# Patient Record
Sex: FEMALE | URBAN - METROPOLITAN AREA
[De-identification: names, ages, dates, MRNs, and addresses within clinical notes are randomized per-mention and may not be internally consistent; named-entity substitution may affect disease eponyms.]

---

## 2017-09-12 ENCOUNTER — APPOINTMENT (RX ONLY)
Dept: URBAN - METROPOLITAN AREA CLINIC 47 | Facility: CLINIC | Age: 27
Setting detail: DERMATOLOGY
End: 2017-09-12

## 2017-09-12 DIAGNOSIS — L30.1 DYSHIDROSIS [POMPHOLYX]: ICD-10-CM

## 2017-09-12 DIAGNOSIS — L21.8 OTHER SEBORRHEIC DERMATITIS: ICD-10-CM

## 2017-09-12 PROCEDURE — 99213 OFFICE O/P EST LOW 20 MIN: CPT

## 2017-09-12 PROCEDURE — ? PRESCRIPTION

## 2017-09-12 PROCEDURE — ? COUNSELING

## 2017-09-12 RX ORDER — TACROLIMUS 0.3 MG/G
OINTMENT TOPICAL
Qty: 1 | Refills: 0 | Status: ERX | COMMUNITY
Start: 2017-09-12

## 2017-09-12 RX ORDER — KETOCONAZOLE 20.5 MG/ML
SHAMPOO, SUSPENSION TOPICAL BIW
Qty: 1 | Refills: 0 | Status: ERX | COMMUNITY
Start: 2017-09-12

## 2017-09-12 RX ADMIN — KETOCONAZOLE: 20.5 SHAMPOO, SUSPENSION TOPICAL at 00:00

## 2017-09-12 RX ADMIN — TACROLIMUS: 0.3 OINTMENT TOPICAL at 00:00

## 2017-09-12 ASSESSMENT — LOCATION DETAILED DESCRIPTION DERM
LOCATION DETAILED: LEFT CENTRAL EYEBROW
LOCATION DETAILED: LEFT RADIAL DORSAL HAND
LOCATION DETAILED: POSTERIOR MID-PARIETAL SCALP
LOCATION DETAILED: RIGHT CENTRAL EYEBROW
LOCATION DETAILED: RIGHT PLANTAR FOREFOOT OVERLYING 3RD METATARSAL
LOCATION DETAILED: RIGHT RADIAL DORSAL HAND
LOCATION DETAILED: RIGHT MEDIAL FRONTAL SCALP
LOCATION DETAILED: LEFT PLANTAR FOREFOOT OVERLYING 2ND METATARSAL
LOCATION DETAILED: LEFT DORSAL FOOT
LOCATION DETAILED: LEFT ACHILLES SKIN

## 2017-09-12 ASSESSMENT — LOCATION SIMPLE DESCRIPTION DERM
LOCATION SIMPLE: RIGHT PLANTAR SURFACE
LOCATION SIMPLE: LEFT ACHILLES SKIN
LOCATION SIMPLE: LEFT FOOT
LOCATION SIMPLE: LEFT PLANTAR SURFACE
LOCATION SIMPLE: RIGHT EYEBROW
LOCATION SIMPLE: LEFT EYEBROW
LOCATION SIMPLE: RIGHT SCALP
LOCATION SIMPLE: POSTERIOR SCALP
LOCATION SIMPLE: RIGHT HAND
LOCATION SIMPLE: LEFT HAND

## 2017-09-12 ASSESSMENT — LOCATION ZONE DERM
LOCATION ZONE: FACE
LOCATION ZONE: FEET
LOCATION ZONE: SCALP
LOCATION ZONE: HAND
LOCATION ZONE: LEG

## 2017-10-06 ENCOUNTER — HOSPITAL ENCOUNTER (INPATIENT)
Dept: HOSPITAL 10 - OBT | Age: 27
LOS: 3 days | Discharge: HOME | End: 2017-10-09
Attending: SPECIALIST | Admitting: SPECIALIST
Payer: COMMERCIAL

## 2017-10-06 VITALS — DIASTOLIC BLOOD PRESSURE: 73 MMHG | HEART RATE: 113 BPM | SYSTOLIC BLOOD PRESSURE: 125 MMHG | RESPIRATION RATE: 18 BRPM

## 2017-10-06 VITALS — BODY MASS INDEX: 25.51 KG/M2 | WEIGHT: 143.96 LBS | HEIGHT: 63 IN

## 2017-10-06 DIAGNOSIS — Z23: ICD-10-CM

## 2017-10-06 DIAGNOSIS — Z3A.40: ICD-10-CM

## 2017-10-06 DIAGNOSIS — O48.0: Primary | ICD-10-CM

## 2017-10-06 LAB
ABNORMAL IP MESSAGE: 1
APTT BLD: 27.9 SEC (ref 25–35)
BASOPHILS # BLD AUTO: 0 10^3/UL (ref 0–0.1)
BASOPHILS NFR BLD: 0.1 % (ref 0–2)
EOSINOPHIL # BLD: 0.1 10^3/UL (ref 0–0.5)
EOSINOPHIL NFR BLD: 1.2 % (ref 0–7)
ERYTHROCYTE [DISTWIDTH] IN BLOOD BY AUTOMATED COUNT: 13.4 % (ref 11.5–14.5)
HCT VFR BLD CALC: 41.1 % (ref 37–47)
HGB BLD-MCNC: 13.9 G/DL (ref 12–16)
INR PPP: 0.85
LYMPHOCYTES # BLD AUTO: 1.6 10^3/UL (ref 0.8–2.9)
LYMPHOCYTES NFR BLD AUTO: 15.9 % (ref 15–51)
MCH RBC QN AUTO: 30.4 PG (ref 29–33)
MCHC RBC AUTO-ENTMCNC: 33.8 G/DL (ref 32–37)
MCV RBC AUTO: 89.9 FL (ref 82–101)
MONOCYTES # BLD: 1.1 10^3/UL (ref 0.3–0.9)
MONOCYTES NFR BLD: 10.7 % (ref 0–11)
NEUTROPHILS # BLD: 7.4 10^3/UL (ref 1.6–7.5)
NEUTROPHILS NFR BLD AUTO: 71.5 % (ref 39–77)
NRBC # BLD MANUAL: 0 10^3/UL (ref 0–0)
NRBC BLD AUTO-RTO: 0 /100WBC (ref 0–0)
PLATELET # BLD: 115 10^3/UL (ref 140–415)
PMV BLD AUTO: 13.1 FL (ref 7.4–10.4)
POSITIVE DIFF: (no result)
PROTHROMBIN TIME: 11.6 SEC (ref 12.2–14.2)
PT RATIO: 0.9
RBC # BLD AUTO: 4.57 10^6/UL (ref 4.2–5.4)
WBC # BLD AUTO: 10.3 10^3/UL (ref 4.8–10.8)

## 2017-10-06 PROCEDURE — 86900 BLOOD TYPING SEROLOGIC ABO: CPT

## 2017-10-06 PROCEDURE — 4A1HXCZ MONITORING OF PRODUCTS OF CONCEPTION, CARDIAC RATE, EXTERNAL APPROACH: ICD-10-PCS | Performed by: OBSTETRICS & GYNECOLOGY

## 2017-10-06 PROCEDURE — 0UQGXZZ REPAIR VAGINA, EXTERNAL APPROACH: ICD-10-PCS | Performed by: OBSTETRICS & GYNECOLOGY

## 2017-10-06 PROCEDURE — 87340 HEPATITIS B SURFACE AG IA: CPT

## 2017-10-06 PROCEDURE — 90715 TDAP VACCINE 7 YRS/> IM: CPT

## 2017-10-06 PROCEDURE — 62319: CPT

## 2017-10-06 PROCEDURE — 80053 COMPREHEN METABOLIC PANEL: CPT

## 2017-10-06 PROCEDURE — 85025 COMPLETE CBC W/AUTO DIFF WBC: CPT

## 2017-10-06 PROCEDURE — 86592 SYPHILIS TEST NON-TREP QUAL: CPT

## 2017-10-06 PROCEDURE — 84560 ASSAY OF URINE/URIC ACID: CPT

## 2017-10-06 PROCEDURE — 81003 URINALYSIS AUTO W/O SCOPE: CPT

## 2017-10-06 PROCEDURE — G0463 HOSPITAL OUTPT CLINIC VISIT: HCPCS

## 2017-10-06 PROCEDURE — 86901 BLOOD TYPING SEROLOGIC RH(D): CPT

## 2017-10-06 PROCEDURE — 85610 PROTHROMBIN TIME: CPT

## 2017-10-06 PROCEDURE — 85730 THROMBOPLASTIN TIME PARTIAL: CPT

## 2017-10-06 PROCEDURE — 81001 URINALYSIS AUTO W/SCOPE: CPT

## 2017-10-06 RX ADMIN — PYRIDOXINE HYDROCHLORIDE SCH MLS/HR: 100 INJECTION, SOLUTION INTRAMUSCULAR; INTRAVENOUS at 21:55

## 2017-10-06 NOTE — TRIAGE
===================================

OB Triage

===================================

Datetime Report Generated by CPN: 10/06/2017 21:07

   

   

===========================

Datetime: 10/06/2017 20:39

===========================

   

 Stage of Pregnancy:  OB Triage

   

===================================

Labor Evaluation

===================================

   

 Monitor Mode:  External

 Pattern:  Normal: <= 5 Contractions in 10 Minutes

 Resting Tone [place]:  Relaxed

   

===================================

Fetal Heart Rate

===================================

   

 FHR Baseline Rate:  135

 Monitor Mode:  External US

 FHR Baseline Changes:  No Baseline Change

 Variability:  Moderate 6-25 bpm

 Accelerations:  15X15

 Decelerations:  None

 Category:  Category I

   

===================================

Pain Assessment

===================================

   

 Pain Scale:  2

 Pain Presence:  Intermittent

 Pain Type:  Cramping

   

===================================

Vaginal Exam

===================================

   

 Dilatation (cms):  1.0

 Effacement (%):  70

 Station:  0

 Exam By:  ALEXIS Lange

 Membrane Status:  Ruptured

 Amniotic Fluid Color:  Clear

 Amniotic Fluid Amount:  Moderate

 Amniotic Fluid Odor:  Normal

 Vaginal Bleeding:  Scant

 Pool:  Positive

 Nitrazine:  Positive

 Cervix, Consistency:  Soft

 Cervix, Position:  Posterior

 Fetal Presentation 'A':  Cephalic

   

===========================

Datetime: 10/06/2017 20:00

===========================

   

 Time of Arrival:  10/06/2017 19:20

 EGA:  40.4

 Arrived By:  Wheelchair

 Arrived From:  Home

 Chief Complaint:   c/o leaking mod amt fluid since 1530. Denies hx problems this pregnancy

 Fetal Movement:  Present

 Contractions:  Denies/Absent

 Rupture of Membranes:  Ruptured

 Vaginal Bleeding:  None

 Vaginal Discharge:  Present

 Recent Sexual Intercouse:  Denies

 Abdominal Trauma:  Not Applicable

 Patient Complaints:  Other

 Time Provider Notified:  10/06/2017 19:35

 Provider Notified:  Dr Enciso

 Initial Plan:  MAEGAN PHAN

## 2017-10-06 NOTE — HP
Date/Time of Note


Date/Time of Note


DATE: 10/6/17 


TIME: 21:38





OB - History


Hx of Present Pregnancy


Free Text/Dictation


27 y.o primigravida  at 40w4d  presented  to triage with c/o leaking  fluid 

since .


had  unevenful prenatal course except 1st trimester vaginal spotting.


initial ve    with confirmation of srom


admitted for pitocin augmentation


Chief Complaint:  SROM  at 1530


Estimated Due Date:  Oct 2, 2017


:  1


Para:  0


Spontaneous :  0


Therapeutic :  0


Prenatal Care:  Good Care


Ultrasounds:  No ultrasounds, Normal mid trimester US


Obstetrical Complications:  None


Medical Complications:  None





Past Family/Social History


*


Past Medical, Surgical, Family and Obstetric Histories reviewed from prenatal 

chart.


Blood Type:  O+


Rubella:  immune


RPR/VDRL:  Negative


GBS Status:  Negative


HBsAG:  Negative





OB  Admission Exam


Vital Signs


Vital Signs





 Vital Signs








  Date Time  Temp Pulse Resp B/P Pulse Ox O2 Delivery O2 Flow Rate FiO2


 


10/6/17 20:50 98.7 113 18 125/73  Room Air  











Physical Exam


HEENT:  WNL


Heart:  Rhythm Normal


Lungs:  Clear, Equal


Abdomen:  WNL


Extremities:  Normal


Reflexes:  Normal


Cervical Dilatation:  1cm


Effacement:  75%


Station:  0


Membranes:  Ruptured


Amniotic Fluid:  Clear


Fetal Heart Rate:  130's


Accelerations:  Accelerations Present


Decelerations:  No Decelerations


Varibility:  Moderate


Contractions on Admission:  6-10 Minutes Apart


Intensity:  Mild





OB  Assessment/Plan


Reason for admission:  rupture of membranes


Other Assessment:


IUP 40w4d


Plan:  Other (augmenttation)


Induction Method:  per Pitocin Protocol











SAM VOSS MD Oct 6, 2017 21:48

## 2017-10-07 VITALS — DIASTOLIC BLOOD PRESSURE: 80 MMHG | RESPIRATION RATE: 20 BRPM | SYSTOLIC BLOOD PRESSURE: 121 MMHG | HEART RATE: 97 BPM

## 2017-10-07 VITALS — SYSTOLIC BLOOD PRESSURE: 113 MMHG | RESPIRATION RATE: 19 BRPM | DIASTOLIC BLOOD PRESSURE: 63 MMHG

## 2017-10-07 LAB
ADD UMIC: NO
ALBUMIN SERPL-MCNC: 3.5 G/DL (ref 3.3–4.9)
ALBUMIN/GLOB SERPL: 0.89 {RATIO}
ALP SERPL-CCNC: 236 IU/L (ref 42–121)
ALT SERPL-CCNC: 38 IU/L (ref 13–69)
ANION GAP SERPL CALC-SCNC: 10 MMOL/L (ref 8–16)
AST SERPL-CCNC: 26 IU/L (ref 15–46)
BILIRUB DIRECT SERPL-MCNC: 0 MG/DL (ref 0–0.2)
BILIRUB SERPL-MCNC: 0.1 MG/DL (ref 0.2–1.3)
BUN SERPL-MCNC: 11 MG/DL (ref 7–20)
CALCIUM SERPL-MCNC: 9.9 MG/DL (ref 8.4–10.2)
CHLORIDE SERPL-SCNC: 107 MMOL/L (ref 97–110)
CO2 SERPL-SCNC: 24 MMOL/L (ref 21–31)
COLOR UR: (no result)
CREAT SERPL-MCNC: 0.56 MG/DL (ref 0.44–1)
GLOBULIN SER-MCNC: 3.9 G/DL (ref 1.3–3.2)
GLUCOSE SERPL-MCNC: 88 MG/DL (ref 70–220)
GLUCOSE UR STRIP-MCNC: NEGATIVE MG/DL
KETONES UR STRIP.AUTO-MCNC: (no result) MG/DL
NITRITE UR QL STRIP.AUTO: NEGATIVE MG/DL
POTASSIUM SERPL-SCNC: 3.7 MMOL/L (ref 3.5–5.1)
PROT SERPL-MCNC: 7.4 G/DL (ref 6.1–8.1)
RBC # UR AUTO: NEGATIVE MG/DL
SODIUM SERPL-SCNC: 137 MMOL/L (ref 135–144)
UR ASCORBIC ACID: NEGATIVE MG/DL
UR BILIRUBIN (DIP): NEGATIVE MG/DL
UR CLARITY: (no result)
UR PH (DIP): 5 (ref 5–9)
UR RBC: 2 /HPF (ref 0–5)
UR SPECIFIC GRAVITY (DIP): 1.02 (ref 1–1.03)
UR TOTAL PROTEIN (DIP): NEGATIVE MG/DL
URATE SERPL-MCNC: 4.3 MG/DL (ref 3.1–7.9)
UROBILINOGEN UR STRIP-ACNC: NEGATIVE MG/DL
WBC # UR STRIP: NEGATIVE LEU/UL

## 2017-10-07 RX ADMIN — PYRIDOXINE HYDROCHLORIDE SCH MLS/HR: 100 INJECTION, SOLUTION INTRAMUSCULAR; INTRAVENOUS at 05:32

## 2017-10-07 RX ADMIN — IBUPROFEN SCH MG: 600 TABLET ORAL at 17:31

## 2017-10-07 RX ADMIN — DOCUSATE SODIUM AND SENNOSIDES SCH TAB: 8.6; 5 TABLET, FILM COATED ORAL at 21:37

## 2017-10-08 VITALS — RESPIRATION RATE: 18 BRPM | HEART RATE: 85 BPM | SYSTOLIC BLOOD PRESSURE: 101 MMHG | DIASTOLIC BLOOD PRESSURE: 48 MMHG

## 2017-10-08 VITALS — HEART RATE: 78 BPM | RESPIRATION RATE: 18 BRPM | DIASTOLIC BLOOD PRESSURE: 72 MMHG | SYSTOLIC BLOOD PRESSURE: 118 MMHG

## 2017-10-08 VITALS — SYSTOLIC BLOOD PRESSURE: 114 MMHG | RESPIRATION RATE: 18 BRPM | DIASTOLIC BLOOD PRESSURE: 66 MMHG | HEART RATE: 84 BPM

## 2017-10-08 VITALS — DIASTOLIC BLOOD PRESSURE: 63 MMHG | SYSTOLIC BLOOD PRESSURE: 117 MMHG | HEART RATE: 79 BPM | RESPIRATION RATE: 18 BRPM

## 2017-10-08 VITALS — RESPIRATION RATE: 18 BRPM | DIASTOLIC BLOOD PRESSURE: 83 MMHG | HEART RATE: 78 BPM | SYSTOLIC BLOOD PRESSURE: 97 MMHG

## 2017-10-08 LAB
BASOPHILS # BLD AUTO: 0 10^3/UL (ref 0–0.1)
BASOPHILS NFR BLD: 0.2 % (ref 0–2)
EOSINOPHIL # BLD: 0.2 10^3/UL (ref 0–0.5)
EOSINOPHIL NFR BLD: 1.3 % (ref 0–7)
ERYTHROCYTE [DISTWIDTH] IN BLOOD BY AUTOMATED COUNT: 14.1 % (ref 11.5–14.5)
HCT VFR BLD CALC: 34 % (ref 37–47)
HGB BLD-MCNC: 11 G/DL (ref 12–16)
LYMPHOCYTES # BLD AUTO: 2.6 10^3/UL (ref 0.8–2.9)
LYMPHOCYTES NFR BLD AUTO: 17 % (ref 15–51)
MCH RBC QN AUTO: 29.3 PG (ref 29–33)
MCHC RBC AUTO-ENTMCNC: 32.4 G/DL (ref 32–37)
MCV RBC AUTO: 90.7 FL (ref 82–101)
MONOCYTES # BLD: 1.3 10^3/UL (ref 0.3–0.9)
MONOCYTES NFR BLD: 8.3 % (ref 0–11)
NEUTROPHILS # BLD: 11 10^3/UL (ref 1.6–7.5)
NEUTROPHILS NFR BLD AUTO: 72.7 % (ref 39–77)
NRBC # BLD MANUAL: 0 10^3/UL (ref 0–0)
NRBC BLD AUTO-RTO: 0 /100WBC (ref 0–0)
PLATELET # BLD: 121 10^3/UL (ref 140–415)
PMV BLD AUTO: 12.3 FL (ref 7.4–10.4)
RBC # BLD AUTO: 3.75 10^6/UL (ref 4.2–5.4)
WBC # BLD AUTO: 15.1 10^3/UL (ref 4.8–10.8)

## 2017-10-08 RX ADMIN — IBUPROFEN SCH MG: 600 TABLET ORAL at 23:30

## 2017-10-08 RX ADMIN — IBUPROFEN SCH MG: 600 TABLET ORAL at 12:11

## 2017-10-08 RX ADMIN — IBUPROFEN SCH MG: 600 TABLET ORAL at 00:27

## 2017-10-08 RX ADMIN — IBUPROFEN SCH MG: 600 TABLET ORAL at 05:35

## 2017-10-08 RX ADMIN — DOCUSATE SODIUM AND SENNOSIDES SCH TAB: 8.6; 5 TABLET, FILM COATED ORAL at 20:32

## 2017-10-08 RX ADMIN — IBUPROFEN SCH MG: 600 TABLET ORAL at 17:25

## 2017-10-08 RX ADMIN — DOCUSATE SODIUM AND SENNOSIDES SCH TAB: 8.6; 5 TABLET, FILM COATED ORAL at 10:16

## 2017-10-08 NOTE — DS
Date/Time of Note


Date/Time of Note


DATE: 10/8/17 


TIME: 11:01





Obstetrical Discharge Record


Final Diagnosis


Final Diagnosis:  Term delivered





Vaginal Delivery


Obstetrical Delivery:  Spontaneous





Complications


 Rupture of Membranes:  No





Condition on Discharge


Physical Assessment


Voiding:  Yes


Bowel Movement:  Yes


Breast:  Soft, non-tender, Filling


Fundus:  Firm


Abdomen and Incision:


soft and not tender


Episiotomy:


no complaints


Calf Tenderness:  No


Patient Condition:  Good











OTIS TEIXEIRA MD Oct 8, 2017 11:02

## 2017-10-09 VITALS — DIASTOLIC BLOOD PRESSURE: 59 MMHG | HEART RATE: 68 BPM | RESPIRATION RATE: 16 BRPM | SYSTOLIC BLOOD PRESSURE: 94 MMHG

## 2017-10-09 VITALS — RESPIRATION RATE: 18 BRPM | DIASTOLIC BLOOD PRESSURE: 59 MMHG | HEART RATE: 73 BPM | SYSTOLIC BLOOD PRESSURE: 102 MMHG

## 2017-10-09 PROCEDURE — 3E0234Z INTRODUCTION OF SERUM, TOXOID AND VACCINE INTO MUSCLE, PERCUTANEOUS APPROACH: ICD-10-PCS | Performed by: SPECIALIST

## 2017-10-09 RX ADMIN — IBUPROFEN SCH MG: 600 TABLET ORAL at 12:00

## 2017-10-09 RX ADMIN — IBUPROFEN SCH MG: 600 TABLET ORAL at 05:31

## 2017-10-09 RX ADMIN — DOCUSATE SODIUM AND SENNOSIDES SCH TAB: 8.6; 5 TABLET, FILM COATED ORAL at 10:35

## 2022-10-06 ENCOUNTER — APPOINTMENT (OUTPATIENT)
Dept: OBGYN | Facility: CLINIC | Age: 32
End: 2022-10-06

## 2022-10-06 PROCEDURE — 99204 OFFICE O/P NEW MOD 45 MIN: CPT | Mod: 25

## 2022-10-06 PROCEDURE — 87220 TISSUE EXAM FOR FUNGI: CPT

## 2022-10-06 PROCEDURE — 83986 ASSAY PH BODY FLUID NOS: CPT | Mod: QW

## 2022-10-06 PROCEDURE — 56820 COLPOSCOPY VULVA: CPT

## 2022-10-06 PROCEDURE — 81002 URINALYSIS NONAUTO W/O SCOPE: CPT

## 2022-10-06 PROCEDURE — 87210 SMEAR WET MOUNT SALINE/INK: CPT | Mod: QW

## 2022-11-16 ENCOUNTER — APPOINTMENT (OUTPATIENT)
Dept: OBGYN | Facility: CLINIC | Age: 32
End: 2022-11-16

## 2022-11-16 PROCEDURE — 99213 OFFICE O/P EST LOW 20 MIN: CPT

## 2022-12-13 ENCOUNTER — APPOINTMENT (OUTPATIENT)
Dept: OBGYN | Facility: CLINIC | Age: 32
End: 2022-12-13

## 2023-01-19 ENCOUNTER — APPOINTMENT (OUTPATIENT)
Dept: HUMAN REPRODUCTION | Facility: CLINIC | Age: 33
End: 2023-01-19
Payer: SELF-PAY

## 2023-01-19 PROCEDURE — 36415 COLL VENOUS BLD VENIPUNCTURE: CPT

## 2023-01-19 PROCEDURE — 76830 TRANSVAGINAL US NON-OB: CPT

## 2023-01-19 PROCEDURE — 99205 OFFICE O/P NEW HI 60 MIN: CPT | Mod: 25

## 2023-01-24 PROBLEM — Z00.00 ENCOUNTER FOR PREVENTIVE HEALTH EXAMINATION: Status: ACTIVE | Noted: 2023-01-24

## 2023-01-26 ENCOUNTER — APPOINTMENT (OUTPATIENT)
Dept: OTOLARYNGOLOGY | Facility: CLINIC | Age: 33
End: 2023-01-26

## 2023-02-08 ENCOUNTER — APPOINTMENT (OUTPATIENT)
Dept: HUMAN REPRODUCTION | Facility: CLINIC | Age: 33
End: 2023-02-08
Payer: SELF-PAY

## 2023-02-08 PROCEDURE — 36415 COLL VENOUS BLD VENIPUNCTURE: CPT

## 2023-02-15 ENCOUNTER — APPOINTMENT (OUTPATIENT)
Dept: HUMAN REPRODUCTION | Facility: CLINIC | Age: 33
End: 2023-02-15
Payer: SELF-PAY

## 2023-02-15 PROCEDURE — 99215 OFFICE O/P EST HI 40 MIN: CPT | Mod: 95

## 2023-05-03 ENCOUNTER — APPOINTMENT (OUTPATIENT)
Dept: OBGYN | Facility: CLINIC | Age: 33
End: 2023-05-03
Payer: COMMERCIAL

## 2023-05-03 PROCEDURE — 99213 OFFICE O/P EST LOW 20 MIN: CPT

## 2023-05-04 ENCOUNTER — TRANSCRIPTION ENCOUNTER (OUTPATIENT)
Age: 33
End: 2023-05-04

## 2023-06-23 ENCOUNTER — APPOINTMENT (OUTPATIENT)
Dept: OBGYN | Facility: CLINIC | Age: 33
End: 2023-06-23
Payer: COMMERCIAL

## 2023-06-23 PROCEDURE — 56820 COLPOSCOPY VULVA: CPT

## 2023-06-23 PROCEDURE — 99213 OFFICE O/P EST LOW 20 MIN: CPT | Mod: 25

## 2023-09-06 ENCOUNTER — APPOINTMENT (OUTPATIENT)
Dept: OBGYN | Facility: CLINIC | Age: 33
End: 2023-09-06
Payer: COMMERCIAL

## 2023-09-06 PROCEDURE — 81025 URINE PREGNANCY TEST: CPT

## 2023-09-06 PROCEDURE — 99213 OFFICE O/P EST LOW 20 MIN: CPT | Mod: 25

## 2023-09-06 PROCEDURE — 81002 URINALYSIS NONAUTO W/O SCOPE: CPT

## 2023-10-11 ENCOUNTER — APPOINTMENT (OUTPATIENT)
Dept: HUMAN REPRODUCTION | Facility: CLINIC | Age: 33
End: 2023-10-11
Payer: SELF-PAY

## 2023-10-11 PROCEDURE — 99215 OFFICE O/P EST HI 40 MIN: CPT | Mod: 95

## 2023-11-17 ENCOUNTER — APPOINTMENT (OUTPATIENT)
Dept: OBGYN | Facility: CLINIC | Age: 33
End: 2023-11-17
Payer: COMMERCIAL

## 2023-11-17 PROCEDURE — 76830 TRANSVAGINAL US NON-OB: CPT

## 2023-11-17 PROCEDURE — 81025 URINE PREGNANCY TEST: CPT

## 2023-11-17 PROCEDURE — 99213 OFFICE O/P EST LOW 20 MIN: CPT | Mod: 25

## 2023-12-27 ENCOUNTER — APPOINTMENT (OUTPATIENT)
Dept: OBGYN | Facility: CLINIC | Age: 33
End: 2023-12-27
Payer: COMMERCIAL

## 2023-12-27 PROCEDURE — 81002 URINALYSIS NONAUTO W/O SCOPE: CPT

## 2023-12-27 PROCEDURE — 99395 PREV VISIT EST AGE 18-39: CPT | Mod: 25

## 2024-03-25 ENCOUNTER — APPOINTMENT (OUTPATIENT)
Dept: OBGYN | Facility: CLINIC | Age: 34
End: 2024-03-25
Payer: MEDICAID

## 2024-03-25 PROCEDURE — 76830 TRANSVAGINAL US NON-OB: CPT

## 2024-03-25 PROCEDURE — 99213 OFFICE O/P EST LOW 20 MIN: CPT

## 2024-04-04 ENCOUNTER — APPOINTMENT (OUTPATIENT)
Dept: OBGYN | Facility: CLINIC | Age: 34
End: 2024-04-04
Payer: MEDICAID

## 2024-04-04 PROCEDURE — 76830 TRANSVAGINAL US NON-OB: CPT

## 2024-04-04 PROCEDURE — 99213 OFFICE O/P EST LOW 20 MIN: CPT

## 2024-04-16 ENCOUNTER — APPOINTMENT (OUTPATIENT)
Dept: OBGYN | Facility: CLINIC | Age: 34
End: 2024-04-16
Payer: MEDICAID

## 2024-04-16 PROCEDURE — 76817 TRANSVAGINAL US OBSTETRIC: CPT

## 2024-04-16 PROCEDURE — 36415 COLL VENOUS BLD VENIPUNCTURE: CPT

## 2024-04-16 PROCEDURE — 81002 URINALYSIS NONAUTO W/O SCOPE: CPT

## 2024-04-16 PROCEDURE — 99214 OFFICE O/P EST MOD 30 MIN: CPT

## 2024-05-10 ENCOUNTER — NON-APPOINTMENT (OUTPATIENT)
Age: 34
End: 2024-05-10

## 2024-05-15 ENCOUNTER — APPOINTMENT (OUTPATIENT)
Dept: ANTEPARTUM | Facility: CLINIC | Age: 34
End: 2024-05-15
Payer: MEDICAID

## 2024-05-15 ENCOUNTER — ASOB RESULT (OUTPATIENT)
Age: 34
End: 2024-05-15

## 2024-05-15 PROCEDURE — 76801 OB US < 14 WKS SINGLE FETUS: CPT

## 2024-05-15 PROCEDURE — 76813 OB US NUCHAL MEAS 1 GEST: CPT

## 2024-06-14 ENCOUNTER — APPOINTMENT (OUTPATIENT)
Dept: OBGYN | Facility: CLINIC | Age: 34
End: 2024-06-14
Payer: MEDICAID

## 2024-06-14 PROCEDURE — 76815 OB US LIMITED FETUS(S): CPT

## 2024-06-14 PROCEDURE — 81002 URINALYSIS NONAUTO W/O SCOPE: CPT

## 2024-06-14 PROCEDURE — 99213 OFFICE O/P EST LOW 20 MIN: CPT

## 2024-06-14 PROCEDURE — 36415 COLL VENOUS BLD VENIPUNCTURE: CPT

## 2024-06-14 PROCEDURE — 0502F SUBSEQUENT PRENATAL CARE: CPT

## 2024-07-10 ENCOUNTER — ASOB RESULT (OUTPATIENT)
Age: 34
End: 2024-07-10

## 2024-07-10 ENCOUNTER — APPOINTMENT (OUTPATIENT)
Dept: ANTEPARTUM | Facility: CLINIC | Age: 34
End: 2024-07-10
Payer: MEDICAID

## 2024-07-10 PROCEDURE — 76805 OB US >/= 14 WKS SNGL FETUS: CPT

## 2024-08-06 ENCOUNTER — APPOINTMENT (OUTPATIENT)
Dept: OBGYN | Facility: CLINIC | Age: 34
End: 2024-08-06

## 2024-08-06 PROCEDURE — 0502F SUBSEQUENT PRENATAL CARE: CPT

## 2024-08-06 PROCEDURE — 81002 URINALYSIS NONAUTO W/O SCOPE: CPT

## 2024-08-06 PROCEDURE — 99213 OFFICE O/P EST LOW 20 MIN: CPT

## 2024-09-03 ENCOUNTER — APPOINTMENT (OUTPATIENT)
Dept: OBGYN | Facility: CLINIC | Age: 34
End: 2024-09-03